# Patient Record
Sex: FEMALE | Race: BLACK OR AFRICAN AMERICAN
[De-identification: names, ages, dates, MRNs, and addresses within clinical notes are randomized per-mention and may not be internally consistent; named-entity substitution may affect disease eponyms.]

---

## 2020-04-26 ENCOUNTER — MESSAGE (OUTPATIENT)
Age: 60
End: 2020-04-26

## 2020-05-07 ENCOUNTER — APPOINTMENT (OUTPATIENT)
Dept: DISASTER EMERGENCY | Facility: HOSPITAL | Age: 60
End: 2020-05-07

## 2020-05-07 LAB
SARS-COV-2 IGG SERPL IA-ACNC: <0.1 INDEX
SARS-COV-2 IGG SERPL QL IA: NEGATIVE

## 2022-10-11 ENCOUNTER — NON-APPOINTMENT (OUTPATIENT)
Age: 62
End: 2022-10-11

## 2022-10-26 PROBLEM — Z00.00 ENCOUNTER FOR PREVENTIVE HEALTH EXAMINATION: Status: ACTIVE | Noted: 2022-10-26

## 2022-10-28 ENCOUNTER — APPOINTMENT (OUTPATIENT)
Dept: PLASTIC SURGERY | Facility: CLINIC | Age: 62
End: 2022-10-28

## 2022-10-28 VITALS — HEIGHT: 63 IN | WEIGHT: 145 LBS | BODY MASS INDEX: 25.69 KG/M2

## 2022-10-28 DIAGNOSIS — Z86.03 PERSONAL HISTORY OF NEOPLASM OF UNCERTAIN BEHAVIOR: ICD-10-CM

## 2022-10-28 DIAGNOSIS — Z86.39 PERSONAL HISTORY OF OTHER ENDOCRINE, NUTRITIONAL AND METABOLIC DISEASE: ICD-10-CM

## 2022-10-28 DIAGNOSIS — Z87.39 PERSONAL HISTORY OF OTHER DISEASES OF THE MUSCULOSKELETAL SYSTEM AND CONNECTIVE TISSUE: ICD-10-CM

## 2022-10-28 PROCEDURE — 99203 OFFICE O/P NEW LOW 30 MIN: CPT

## 2022-10-28 RX ORDER — MV-MIN/FOLIC/VIT K/LYCOP/COQ10 200-100MCG
CAPSULE ORAL
Refills: 0 | Status: ACTIVE | COMMUNITY

## 2022-10-28 RX ORDER — LEVOTHYROXINE SODIUM 0.17 MG/1
TABLET ORAL
Refills: 0 | Status: ACTIVE | COMMUNITY

## 2022-10-28 RX ORDER — DOCUSATE SODIUM 100 MG/1
100 CAPSULE ORAL
Refills: 0 | Status: ACTIVE | COMMUNITY

## 2022-10-28 RX ORDER — CHOLECALCIFEROL (VITAMIN D3) 25 MCG
TABLET ORAL
Refills: 0 | Status: ACTIVE | COMMUNITY

## 2022-10-28 NOTE — HISTORY OF PRESENT ILLNESS
[FreeTextEntry1] : 63 y/o female with h/o hypothyroidism, osteoporosis who presents for evaluation of lesion on L NL fold, present most of her life. Pt states the lesion Has been growing past few years, which is concerning to her.. denies drainage/bleeding/pain/ trauma to area. Denies personal or family h/o skin cancer\par \par Here to discuss treatment options.\par \par Denies h/o DVT/PE or MRSA infection\par No h/o DM\par Non-smoker\par Occupation: RN at [employer]\par \par

## 2022-10-28 NOTE — PHYSICAL EXAM
[de-identified] : well developed well nourished in NAD [de-identified] : ATNC [de-identified] : RASHAUNs [de-identified] : non labored [de-identified] : L upper NL fold with 5mm raised darkly pigmented circular lesion with no red flag features. No surrounding skin changes. No punctum.

## 2022-10-28 NOTE — ASSESSMENT
[FreeTextEntry1] : 61 yo F with enlarging pigmented nevus with pt concern for malignancy.\par No clinically apparent malignant features\par \par Recommend left cheek skin lesion excision with complex closure, r/o malignancy\par \par -Benefits, risks and alternatives of the procedure were discussed. The risks include but not limited to bleeding, infection, poor wound healing and scarring, skin lesion recurrent, possible keloid, and need for re-operation. Location of scar and expected post-surgical outcomes were discussed. 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 Masks were used by the healthcare provider and staff and the examination room was cleaned after the patient visit concluded\par \par \par

## 2022-11-27 ENCOUNTER — NON-APPOINTMENT (OUTPATIENT)
Age: 62
End: 2022-11-27

## 2023-01-30 ENCOUNTER — APPOINTMENT (OUTPATIENT)
Dept: PLASTIC SURGERY | Facility: CLINIC | Age: 63
End: 2023-01-30
Payer: COMMERCIAL

## 2023-01-30 DIAGNOSIS — D48.5 NEOPLASM OF UNCERTAIN BEHAVIOR OF SKIN: ICD-10-CM

## 2023-01-30 PROCEDURE — 13131 CMPLX RPR F/C/C/M/N/AX/G/H/F: CPT

## 2023-01-30 PROCEDURE — 11442 EXC FACE-MM B9+MARG 1.1-2 CM: CPT

## 2023-01-30 NOTE — HISTORY OF PRESENT ILLNESS
[FreeTextEntry1] : 63 y/o female with h/o hypothyroidism, osteoporosis who presents for evaluation of lesion on L NL fold, present most of her life. Pt states the lesion Has been growing past few years, which is concerning to her.. denies drainage/bleeding/pain/ trauma to area. Denies personal or family h/o skin cancer\par \par Here to discuss treatment options.\par \par Denies h/o DVT/PE or MRSA infection\par No h/o DM\par Non-smoker\par Occupation: RN at [employer]\par \par Interval hx (1/30/23):  here for procedure today with left NL skin lesion excision.  No ASA.  no fevers, chills, redness\par \par \par

## 2023-01-30 NOTE — ASSESSMENT
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 Masks were used by the healthcare provider and staff and the examination room was cleaned after the patient visit concluded\par \par \par

## 2023-01-30 NOTE — PROCEDURE
[FreeTextEntry1] : left NLF skin lesion [FreeTextEntry2] : left NLF skin lesion excision and complex closure [FreeTextEntry6] : Benefits, risks and alternatives of the procedure were discussed. The risks include but not limited to bleeding, infection, poor wound healing and scarring, possible keloid, and need for re-operation. Location of scar and expected post-surgical outcomes were discussed. The patient understands the risks and would like to proceed with the office surgery.\par \par The skin lesion was marked and infiltrated with local anesthesia to effect.  The excision site was prepared and draped in sterile fashion.\par The skin lesion was excised with the indicated margins in the usual fashion and sent to pathology for review. \par Surgical wounds were made hemostatic and repaired as follows:\par \par Site: left cheek NLF\par Skin lesion width (with margins): 6 mm x 2 cm\par Closure complexity and length: 2.5 cm complex (undermining; 4-0 monocryl DD and 5-0 running subcuticular, 5-0 Fast ab gut simple)\par  [FreeTextEntry7] : left cheek skin lesion (12:00 suture)

## 2023-01-30 NOTE — PHYSICAL EXAM
[de-identified] : well developed well nourished in NAD [de-identified] : ATNC [de-identified] : RASHAUNs [de-identified] : non labored [de-identified] : L upper NL fold with 5mm raised darkly pigmented circular lesion with no red flag features. No surrounding skin changes. No punctum.

## 2023-02-06 ENCOUNTER — APPOINTMENT (OUTPATIENT)
Dept: PLASTIC SURGERY | Facility: CLINIC | Age: 63
End: 2023-02-06
Payer: COMMERCIAL

## 2023-02-06 DIAGNOSIS — D23.30 OTHER BENIGN NEOPLASM OF SKIN OF UNSPECIFIED PART OF FACE: ICD-10-CM

## 2023-02-06 LAB — CORE LAB BIOPSY: NORMAL

## 2023-02-06 PROCEDURE — 99024 POSTOP FOLLOW-UP VISIT: CPT

## 2023-02-06 NOTE — DATA REVIEWED
[FreeTextEntry1] : Tissue Biopsy             Final\par \par No Documents Attached\par \par \par   Patient:   ELVI DILLON\par \par \par Accession:                             [id number]\par \par Collected Date/Time:                   1/30/2023 11:09 EST\par Received Date/Time:                    1/31/2023 10:29 EST\par \par Surgical Pathology Report - Auth (Verified)\par \par Specimen(s) Submitted\par Left cheek skin lesion\par \par Final Diagnosis\par Left cheek skin lesion, excision:\par -  Intradermal melanocytic nevus.\par \par Verified by: Marlene Broussard M.D.\par (Electronic Signature)\par Reported on: 02/01/23 14:00 EST, [facility],\par  [address], [place] [address]\par Phone: [phone]   Fax: [phone]\par _________________________________________________________________\par \par Clinical Information\par Left cheek skin lesion (suture marks superior 12 oclock)\par \par \par Perioperative Diagnosis\par D48.5-Neoplasm of uncertain behavior of skin\par \par Gross Description\par Received in formalin labeled "left cheek skin lesion suture marks superior\par  12:00".  The specimen consists of 1 oriented tan skin ellipse measuring\par  2.4 x 0.7 x 0.3 cm.  There is a raised pigmented lesion measuring 0.5 x\par  0.4 cm.  There is a suture designating 12:00.  The 12:00-3:00-6:00 margin\par  is inked green, the 6:00-9:00-12:00 margin is inked blue and the deep\par  margin is inked black.  The lesion is 1.1 cm from the 12:00 margin, 0.6\par  cm from the 6:00 margin, the lesion abuts the 3:00 margin and is 0.1 cm\par  from the 9:00 margin. The specimen is sequentially sectioned from the\par  12:00 tip (suture) to the 6:00 tip and entirely submitted.\par \par Summary of sections:\par 1A-12:00 tip (suture) -1\par 3H-9U-tshrxakzxk sections (1E-1G-lesion) -7\par 1I-6:00 tip -1\par \par Total blocks - 9\par \par Specimen was received and underwent gross examination at [place] [address].\par \par 01/31/2023 15:54:54 EST   LB\par \par  \par \par  Ordered by: EDEN ALVAREZ       Collected/Examined: [id number] 11:09AM       \par Verification Required       Stage: Final       \par  Performed at: [employer] [employer] (Med Director: Johnny Pringle)       Resulted: [id number] 02:00PM       Last Updated: [id number] 02:00PM       Accession: [id number]

## 2023-02-06 NOTE — PHYSICAL EXAM
[de-identified] : well developed well nourished in NAD [de-identified] : non labored [de-identified] : Face - left NL fold incision healing well, c/d/i, no erythema, minimal swelling, excellent cosmesis

## 2023-02-06 NOTE — HISTORY OF PRESENT ILLNESS
[FreeTextEntry1] : 63 y/o female with h/o hypothyroidism, osteoporosis who presents for evaluation of lesion on L NL fold, present most of her life. Pt states the lesion Has been growing past few years, which is concerning to her.. denies drainage/bleeding/pain/ trauma to area. Denies personal or family h/o skin cancer\par \par Here to discuss treatment options.\par \par Denies h/o DVT/PE or MRSA infection\par No h/o DM\par Non-smoker\par Occupation: RN at [employer]\par \par Interval hx (1/30/23):  here for procedure today with left NL skin lesion excision.  No ASA.  no fevers, chills, redness\par \par Interval hx (2/6/23): Pt presents today POD#7 s/p left NL skin lesion excision. Doing well. Denies any f/c or drainage. \par \par

## 2023-02-06 NOTE — ASSESSMENT
[FreeTextEntry1] : 61 yo F with enlarging pigmented nevus with pt concern for malignancy.\par No clinically apparent malignant features.\par \par Now POD#7 s/p left cheek skin lesion excision with complex closure. Doing well. \par \par - sutures removed\par - daily Aquaphor\par - pathology discussed - melanocytic nevus \par - no strenuous physical activity\par - sun protection\par - dermatologic f/u \par - Follow up in 1 month with Dr. Elena. \par \par Due to COVID-19, pre-visit patient instructions were explained to the patient and their symptoms were checked upon arrival. Masks were used by the healthcare provider and staff and the examination room was cleaned after the patient visit concluded\par \par \par

## 2023-03-06 ENCOUNTER — APPOINTMENT (OUTPATIENT)
Dept: PLASTIC SURGERY | Facility: CLINIC | Age: 63
End: 2023-03-06
Payer: COMMERCIAL

## 2023-03-06 PROCEDURE — 99212 OFFICE O/P EST SF 10 MIN: CPT

## 2023-03-06 NOTE — DATA REVIEWED
[FreeTextEntry1] : Tissue Biopsy             Final\par \par No Documents Attached\par \par \par   Patient:   ELVI DILLON\par \par \par Accession:                             [id number]\par \par Collected Date/Time:                   1/30/2023 11:09 EST\par Received Date/Time:                    1/31/2023 10:29 EST\par \par Surgical Pathology Report - Auth (Verified)\par \par Specimen(s) Submitted\par Left cheek skin lesion\par \par Final Diagnosis\par Left cheek skin lesion, excision:\par -  Intradermal melanocytic nevus.\par \par Verified by: Marlene Broussard M.D.\par (Electronic Signature)\par Reported on: 02/01/23 14:00 EST, [facility],\par  [address], [place] [address]\par Phone: [phone]   Fax: [phone]\par _________________________________________________________________\par \par Clinical Information\par Left cheek skin lesion (suture marks superior 12 oclock)\par \par \par Perioperative Diagnosis\par D48.5-Neoplasm of uncertain behavior of skin\par \par Gross Description\par Received in formalin labeled "left cheek skin lesion suture marks superior\par  12:00".  The specimen consists of 1 oriented tan skin ellipse measuring\par  2.4 x 0.7 x 0.3 cm.  There is a raised pigmented lesion measuring 0.5 x\par  0.4 cm.  There is a suture designating 12:00.  The 12:00-3:00-6:00 margin\par  is inked green, the 6:00-9:00-12:00 margin is inked blue and the deep\par  margin is inked black.  The lesion is 1.1 cm from the 12:00 margin, 0.6\par  cm from the 6:00 margin, the lesion abuts the 3:00 margin and is 0.1 cm\par  from the 9:00 margin. The specimen is sequentially sectioned from the\par  12:00 tip (suture) to the 6:00 tip and entirely submitted.\par \par Summary of sections:\par 1A-12:00 tip (suture) -1\par 6L-9T-dbbqbmuaix sections (1E-1G-lesion) -7\par 1I-6:00 tip -1\par \par Total blocks - 9\par \par Specimen was received and underwent gross examination at [facility]\[place] [address].\par \par 01/31/2023 15:54:54 EST   LB\par \par  \par \par  Ordered by: EDEN ALVAREZ       Collected/Examined: [id number] 11:09AM       \par Verification Required       Stage: Final       \par  Performed at: [employer] (Med Director: Johnny Pringle)       Resulted: [id number] 02:00PM       Last Updated: [id number] 02:00PM       Accession: [id number]

## 2023-03-06 NOTE — PHYSICAL EXAM
[de-identified] : well developed well nourished in NAD [de-identified] : non labored [de-identified] : Face - left NL fold incision healing well,excellent cosmesis, PIH, no HTS

## 2023-03-06 NOTE — ASSESSMENT
[FreeTextEntry1] : 63 yo F with enlarging pigmented nevus with pt concern for malignancy.\par No clinically apparent malignant features.\par \par 5 weeks s/p left cheek skin lesion excision with complex closure. Doing well. \par \par - daily Aquaphor\par - scar cream x 3 months\par - pathology discussed - melanocytic nevus \par - sun protection\par - dermatologic f/u \par - Follow up in 2 months for scar check\par \par Due to COVID-19, pre-visit patient instructions were explained to the patient and their symptoms were checked upon arrival. Masks were used by the healthcare provider and staff and the examination room was cleaned after the patient visit concluded\par \par \par

## 2023-03-06 NOTE — HISTORY OF PRESENT ILLNESS
[FreeTextEntry1] : 61 y/o female with h/o hypothyroidism, osteoporosis who presents for evaluation of lesion on L NL fold, present most of her life. Pt states the lesion Has been growing past few years, which is concerning to her.. denies drainage/bleeding/pain/ trauma to area. Denies personal or family h/o skin cancer\par \par Here to discuss treatment options.\par \par Denies h/o DVT/PE or MRSA infection\par No h/o DM\par Non-smoker\par Occupation: RN at [employer]\par \par Interval hx (1/30/23):  here for procedure today with left NL skin lesion excision.  No ASA.  no fevers, chills, redness\par \par Interval hx (2/6/23): Pt presents today POD#7 s/p left NL skin lesion excision. Doing well. Denies any f/c or drainage. \par \par Interval hx (3/6/23): 5 weeks s/p  left NL skin lesion excision. doing well. no complaints\par

## 2023-03-23 ENCOUNTER — APPOINTMENT (OUTPATIENT)
Dept: OBGYN | Facility: CLINIC | Age: 63
End: 2023-03-23

## 2023-04-17 ENCOUNTER — NON-APPOINTMENT (OUTPATIENT)
Age: 63
End: 2023-04-17

## 2023-05-08 ENCOUNTER — APPOINTMENT (OUTPATIENT)
Dept: PLASTIC SURGERY | Facility: CLINIC | Age: 63
End: 2023-05-08

## 2023-08-08 ENCOUNTER — NON-APPOINTMENT (OUTPATIENT)
Age: 63
End: 2023-08-08